# Patient Record
Sex: FEMALE | Race: BLACK OR AFRICAN AMERICAN | NOT HISPANIC OR LATINO | URBAN - METROPOLITAN AREA
[De-identification: names, ages, dates, MRNs, and addresses within clinical notes are randomized per-mention and may not be internally consistent; named-entity substitution may affect disease eponyms.]

---

## 2018-03-16 ENCOUNTER — TELEPHONE (OUTPATIENT)
Dept: TRANSPLANT | Facility: CLINIC | Age: 43
End: 2018-03-16

## 2018-03-16 NOTE — TELEPHONE ENCOUNTER
Jazmyn Siegel called and stated that she is interested in becoming a living donor for her mother, Wen Siegel.  Medical and social history obtained.  No contraindications noted.  All questions answered.  HLA kit requested.

## 2018-04-04 ENCOUNTER — TELEPHONE (OUTPATIENT)
Dept: TRANSPLANT | Facility: CLINIC | Age: 43
End: 2018-04-04

## 2018-04-04 DIAGNOSIS — Z00.5 TRANSPLANT DONOR EVALUATION: Primary | ICD-10-CM

## 2018-04-04 NOTE — TELEPHONE ENCOUNTER
Patient called stating that she has received the kit and will attempt to have blood drawn tomorrow, 4/5/18.  Instructions provided.  All questions answered and patient verbalized understanding.

## 2018-04-05 PROCEDURE — 81373 HLA I TYPING 1 LOCUS LR: CPT | Mod: PO

## 2018-04-05 PROCEDURE — 81373 HLA I TYPING 1 LOCUS LR: CPT | Mod: 91,PO

## 2018-04-05 PROCEDURE — 81376 HLA II TYPING 1 LOCUS LR: CPT | Mod: 91,PO

## 2018-04-05 PROCEDURE — 81376 HLA II TYPING 1 LOCUS LR: CPT | Mod: PO

## 2018-04-06 ENCOUNTER — TELEPHONE (OUTPATIENT)
Dept: TRANSPLANT | Facility: CLINIC | Age: 43
End: 2018-04-06

## 2018-04-06 ENCOUNTER — LAB VISIT (OUTPATIENT)
Dept: LAB | Facility: HOSPITAL | Age: 43
End: 2018-04-06
Payer: COMMERCIAL

## 2018-04-06 DIAGNOSIS — Z00.5 TRANSPLANT DONOR EVALUATION: ICD-10-CM

## 2018-04-06 NOTE — TELEPHONE ENCOUNTER
HLA called stating blood sample was received today for patient but red tube was not labeled.  Left voicemail for patient that another kit will be requested with only the red tube included to have redrawn.

## 2018-04-09 ENCOUNTER — LAB VISIT (OUTPATIENT)
Dept: LAB | Facility: HOSPITAL | Age: 43
End: 2018-04-09
Payer: COMMERCIAL

## 2018-04-09 ENCOUNTER — TELEPHONE (OUTPATIENT)
Dept: TRANSPLANT | Facility: CLINIC | Age: 43
End: 2018-04-09

## 2018-04-09 DIAGNOSIS — Z00.5 TRANSPLANT DONOR EVALUATION: ICD-10-CM

## 2018-04-09 LAB
ABO GROUP BLD: NORMAL
BLD GP AB SCN CELLS X3 SERPL QL: NORMAL
RH BLD: NORMAL

## 2018-04-09 PROCEDURE — 86900 BLOOD TYPING SEROLOGIC ABO: CPT | Mod: TXP

## 2018-04-09 PROCEDURE — 36415 COLL VENOUS BLD VENIPUNCTURE: CPT | Mod: TXP

## 2018-04-09 PROCEDURE — 86850 RBC ANTIBODY SCREEN: CPT | Mod: TXP

## 2018-04-09 PROCEDURE — 86905 BLOOD TYPING RBC ANTIGENS: CPT | Mod: TXP

## 2018-04-09 PROCEDURE — 86901 BLOOD TYPING SEROLOGIC RH(D): CPT | Mod: TXP

## 2018-04-09 NOTE — TELEPHONE ENCOUNTER
Left voicemail for patient to inform her that after speaking with HLA lab that her current sample can be used.  Another sample is not necessary.

## 2018-04-18 LAB — HLATY INTERPRETATION: NORMAL

## 2018-04-19 LAB — A1 AG RBC QL: NORMAL

## 2018-04-23 ENCOUNTER — TELEPHONE (OUTPATIENT)
Dept: TRANSPLANT | Facility: CLINIC | Age: 43
End: 2018-04-23

## 2018-04-23 NOTE — TELEPHONE ENCOUNTER
Patient notified that she is blood type A+, which is not compatible with her mother (O). Discussed the option of paired donation. Instructed to contact me once the recipient is on the waiting list if she would like to proceed. All questions were answered and patient verbalized understanding.

## 2018-05-08 LAB
HLA DRB4 1: NORMAL
HLA SSO DNA TYPING CLASS I & II INTERPRETATION: NORMAL
HLA-A 1 SERO. EQUIV: 2
HLA-A 1: NORMAL
HLA-A 2 SERO. EQUIV: 30
HLA-A 2: NORMAL
HLA-B 1 SERO. EQUIV: 18
HLA-B 1: NORMAL
HLA-B 2 SERO. EQUIV: 51
HLA-B 2: NORMAL
HLA-BW 1 SERO. EQUIV: 6
HLA-BW 2 SERO. EQUIV: 4
HLA-C 1: NORMAL
HLA-C 2: NORMAL
HLA-CW 1 SERO. EQUIV: 5
HLA-CW 2 SERO. EQUIV: 15
HLA-DQ 1 SERO. EQUIV: 2
HLA-DQ 2 SERO. EQUIV: 6
HLA-DQB1 1: NORMAL
HLA-DQB1 2: NORMAL
HLA-DRB1 1 SERO. EQUIV: 17
HLA-DRB1 1: NORMAL
HLA-DRB1 2 SERO. EQUIV: 15
HLA-DRB1 2: NORMAL
HLA-DRB3 1: NORMAL
HLA-DRB3 2: NORMAL
HLA-DRB345 1 SERO. EQUIV: 52
HLA-DRB345 2 SERO. EQUIV: 51
HLA-DRB4 2: NORMAL
HLA-DRB5 1: NORMAL
HLA-DRB5 2: NORMAL
SSDQB TESTING DATE: NORMAL
SSOA TESTING DATE: NORMAL
SSOB TESTING DATE: NORMAL
SSOC TESTING DATE: NORMAL
SSODR TESTING DATE: NORMAL
TYSSO TESTING DATE: NORMAL

## 2018-12-13 ENCOUNTER — TELEPHONE (OUTPATIENT)
Dept: TRANSPLANT | Facility: CLINIC | Age: 43
End: 2018-12-13

## 2018-12-13 DIAGNOSIS — Z00.5 TRANSPLANT DONOR EVALUATION: Primary | ICD-10-CM

## 2018-12-13 NOTE — TELEPHONE ENCOUNTER
Patient contacted me, would like to proceed with testing for paired donation. Required testing discussed and information provided to schedule appointments. All questions were answered.

## 2018-12-17 DIAGNOSIS — Z00.5 TRANSPLANT DONOR EVALUATION: Primary | ICD-10-CM

## 2019-01-29 ENCOUNTER — HOSPITAL ENCOUNTER (OUTPATIENT)
Dept: CARDIOLOGY | Facility: CLINIC | Age: 44
Discharge: HOME | End: 2019-01-29
Attending: NURSE PRACTITIONER
Payer: COMMERCIAL

## 2019-01-29 ENCOUNTER — HOSPITAL ENCOUNTER (OUTPATIENT)
Dept: RADIOLOGY | Facility: HOSPITAL | Age: 44
Discharge: HOME | End: 2019-01-29
Attending: NURSE PRACTITIONER
Payer: COMMERCIAL

## 2019-01-29 VITALS — HEIGHT: 66 IN | BODY MASS INDEX: 26.68 KG/M2 | WEIGHT: 166 LBS

## 2019-01-29 DIAGNOSIS — Z00.5 TRANSPLANT DONOR EVALUATION: ICD-10-CM

## 2019-01-29 LAB
ASCENDING AORTA: 3.14 CM
BSA FOR ECHO PROCEDURE: 1.87 M2
CV ECHO LV RWT: 0.36 CM
CV STRESS BASE HR: 70
DIASTOLIC BLOOD PRESSURE: 58
DOP CALC LVOT AREA: 3.46 CM2
DOP CALC LVOT DIAMETER: 2.1 CM
DOP CALC LVOT PEAK VEL: 0.94 M/S
DOP CALC LVOT STROKE VOLUME: 60.44 CM3
DOP CALCLVOT PEAK VEL VTI: 17.46 CM
E WAVE DECELERATION TIME: 174.32 MSEC
E/A RATIO: 1.2
E/E' RATIO: 5.45
ECHO LV POSTERIOR WALL: 0.76 CM (ref 0.6–1.1)
FRACTIONAL SHORTENING: 33 % (ref 28–44)
INTERVENTRICULAR SEPTUM: 0.75 CM (ref 0.6–1.1)
IVRT: 0.09 MSEC
LA MAJOR: 4.67 CM
LA MINOR: 5.45 CM
LA WIDTH: 4.16 CM
LEFT ATRIUM SIZE: 3.7 CM
LEFT ATRIUM VOLUME INDEX: 35.6 ML/M2
LEFT ATRIUM VOLUME: 65.81 CM3
LEFT INTERNAL DIMENSION IN SYSTOLE: 2.86 CM (ref 2.1–4)
LEFT VENTRICLE DIASTOLIC VOLUME INDEX: 43.68 ML/M2
LEFT VENTRICLE DIASTOLIC VOLUME: 80.72 ML
LEFT VENTRICLE MASS INDEX: 51.8 G/M2
LEFT VENTRICLE SYSTOLIC VOLUME INDEX: 16.8 ML/M2
LEFT VENTRICLE SYSTOLIC VOLUME: 31.02 ML
LEFT VENTRICULAR INTERNAL DIMENSION IN DIASTOLE: 4.25 CM (ref 3.5–6)
LEFT VENTRICULAR MASS: 95.73 G
LV LATERAL E/E' RATIO: 5
LV SEPTAL E/E' RATIO: 6
MV PEAK A VEL: 0.5 M/S
MV PEAK E VEL: 0.6 M/S
OHS CV CPX 1 MINUTE RECOVERY HEART RATE: 126 BPM
OHS CV CPX 85 PERCENT MAX PREDICTED HEART RATE MALE: 143
OHS CV CPX ESTIMATED METS: 13
OHS CV CPX MAX PREDICTED HEART RATE: 168
OHS CV CPX PATIENT IS FEMALE: 1
OHS CV CPX PATIENT IS MALE: 0
OHS CV CPX PEAK DIASTOLIC BLOOD PRESSURE: 48 MMHG
OHS CV CPX PEAK HEAR RATE: 164
OHS CV CPX PEAK RATE PRESSURE PRODUCT: NORMAL
OHS CV CPX PEAK SYSTOLIC BLOOD PRESSURE: 141
OHS CV CPX PERCENT MAX PREDICTED HEART RATE ACHIEVED: 98
OHS CV CPX PERCENT TARGET HEART RATE ACHIEVED: 114.69
OHS CV CPX RATE PRESSURE PRODUCT PRESENTING: 7070
OHS CV CPX TARGET HEART RATE: 143
PULM VEIN S/D RATIO: 1.03
PV PEAK D VEL: 0.39 M/S
PV PEAK S VEL: 0.4 M/S
RA MAJOR: 4.48 CM
RA WIDTH: 3.41 CM
RIGHT VENTRICULAR END-DIASTOLIC DIMENSION: 2.57 CM
RV TISSUE DOPPLER FREE WALL SYSTOLIC VELOCITY 1 (APICAL 4 CHAMBER VIEW): 13.42 M/S
SINUS: 3.17 CM
STJ: 2.84 CM
STRESS ECHO POST EXERCISE DUR MIN: 7 MIN
STRESS ECHO POST EXERCISE DUR SEC: 42
SYSTOLIC BLOOD PRESSURE: 101
TDI LATERAL: 0.12
TDI SEPTAL: 0.1
TDI: 0.11
TRICUSPID ANNULAR PLANE SYSTOLIC EXCURSION: 2.24 CM

## 2019-01-29 PROCEDURE — 93351 ECHOCARDIOGRAM STRESS TEST (CUPID ONLY): ICD-10-PCS | Mod: S$GLB,TXP,, | Performed by: INTERNAL MEDICINE

## 2019-01-29 PROCEDURE — 71046 XR CHEST PA AND LATERAL: ICD-10-PCS | Mod: 26,TXP,, | Performed by: RADIOLOGY

## 2019-01-29 PROCEDURE — 93351 STRESS TTE COMPLETE: CPT | Mod: S$GLB,TXP,, | Performed by: INTERNAL MEDICINE

## 2019-01-29 PROCEDURE — 71046 X-RAY EXAM CHEST 2 VIEWS: CPT | Mod: 26,TXP,, | Performed by: RADIOLOGY

## 2019-01-29 PROCEDURE — 71046 X-RAY EXAM CHEST 2 VIEWS: CPT | Mod: TC,FY,TXP

## 2019-01-30 ENCOUNTER — OFFICE VISIT (OUTPATIENT)
Dept: TRANSPLANT | Facility: CLINIC | Age: 44
End: 2019-01-30
Payer: COMMERCIAL

## 2019-01-30 ENCOUNTER — HOSPITAL ENCOUNTER (OUTPATIENT)
Dept: RADIOLOGY | Facility: HOSPITAL | Age: 44
Discharge: HOME | End: 2019-01-30
Attending: TRANSPLANT SURGERY
Payer: COMMERCIAL

## 2019-01-30 ENCOUNTER — HOSPITAL ENCOUNTER (OUTPATIENT)
Dept: RADIOLOGY | Facility: HOSPITAL | Age: 44
Discharge: HOME | End: 2019-01-30
Attending: INTERNAL MEDICINE
Payer: COMMERCIAL

## 2019-01-30 VITALS
RESPIRATION RATE: 17 BRPM | SYSTOLIC BLOOD PRESSURE: 108 MMHG | HEART RATE: 66 BPM | DIASTOLIC BLOOD PRESSURE: 62 MMHG | BODY MASS INDEX: 28.91 KG/M2 | HEIGHT: 64 IN | WEIGHT: 169.31 LBS | TEMPERATURE: 98 F | OXYGEN SATURATION: 98 %

## 2019-01-30 DIAGNOSIS — Z00.5 TRANSPLANT DONOR EVALUATION: ICD-10-CM

## 2019-01-30 DIAGNOSIS — Z00.5 TRANSPLANT DONOR EVALUATION: Primary | ICD-10-CM

## 2019-01-30 DIAGNOSIS — I10 HYPERTENSION, UNSPECIFIED TYPE: ICD-10-CM

## 2019-01-30 PROCEDURE — 3074F PR MOST RECENT SYSTOLIC BLOOD PRESSURE < 130 MM HG: ICD-10-PCS | Mod: CPTII,S$GLB,TXP, | Performed by: INTERNAL MEDICINE

## 2019-01-30 PROCEDURE — 99203 PR OFFICE/OUTPT VISIT, NEW, LEVL III, 30-44 MIN: ICD-10-PCS | Mod: S$GLB,TXP,, | Performed by: TRANSPLANT SURGERY

## 2019-01-30 PROCEDURE — 74177 CT ABDOMEN PELVIS WITH CONTRAST: ICD-10-PCS | Mod: 26,TXP,, | Performed by: RADIOLOGY

## 2019-01-30 PROCEDURE — A9539 TC99M PENTETATE: HCPCS | Mod: TXP

## 2019-01-30 PROCEDURE — 99999 PR PBB SHADOW E&M-EST. PATIENT-LVL IV: CPT | Mod: PBBFAC,TXP,, | Performed by: INTERNAL MEDICINE

## 2019-01-30 PROCEDURE — 74177 CT ABD & PELVIS W/CONTRAST: CPT | Mod: 26,TXP,, | Performed by: RADIOLOGY

## 2019-01-30 PROCEDURE — 99205 PR OFFICE/OUTPT VISIT, NEW, LEVL V, 60-74 MIN: ICD-10-PCS | Mod: S$GLB,TXP,, | Performed by: INTERNAL MEDICINE

## 2019-01-30 PROCEDURE — 99999 PR PBB SHADOW E&M-EST. PATIENT-LVL IV: ICD-10-PCS | Mod: PBBFAC,TXP,, | Performed by: INTERNAL MEDICINE

## 2019-01-30 PROCEDURE — 74177 CT ABD & PELVIS W/CONTRAST: CPT | Mod: TC,TXP

## 2019-01-30 PROCEDURE — 25500020 PHARM REV CODE 255: Mod: TXP | Performed by: TRANSPLANT SURGERY

## 2019-01-30 PROCEDURE — 78701 KIDNEY IMAGING WITH FLOW: CPT | Mod: 26,TXP,, | Performed by: RADIOLOGY

## 2019-01-30 PROCEDURE — 3078F PR MOST RECENT DIASTOLIC BLOOD PRESSURE < 80 MM HG: ICD-10-PCS | Mod: CPTII,S$GLB,TXP, | Performed by: INTERNAL MEDICINE

## 2019-01-30 PROCEDURE — 3008F BODY MASS INDEX DOCD: CPT | Mod: CPTII,S$GLB,TXP, | Performed by: INTERNAL MEDICINE

## 2019-01-30 PROCEDURE — 99203 OFFICE O/P NEW LOW 30 MIN: CPT | Mod: S$GLB,TXP,, | Performed by: TRANSPLANT SURGERY

## 2019-01-30 PROCEDURE — 3078F DIAST BP <80 MM HG: CPT | Mod: CPTII,S$GLB,TXP, | Performed by: INTERNAL MEDICINE

## 2019-01-30 PROCEDURE — 3074F SYST BP LT 130 MM HG: CPT | Mod: CPTII,S$GLB,TXP, | Performed by: INTERNAL MEDICINE

## 2019-01-30 PROCEDURE — 3008F PR BODY MASS INDEX (BMI) DOCUMENTED: ICD-10-PCS | Mod: CPTII,S$GLB,TXP, | Performed by: INTERNAL MEDICINE

## 2019-01-30 PROCEDURE — 78701 NM KIDNEY IMAGING MORPH W VASCULAR: ICD-10-PCS | Mod: 26,TXP,, | Performed by: RADIOLOGY

## 2019-01-30 PROCEDURE — 99205 OFFICE O/P NEW HI 60 MIN: CPT | Mod: S$GLB,TXP,, | Performed by: INTERNAL MEDICINE

## 2019-01-30 RX ORDER — POTASSIUM CHLORIDE 750 MG/1
10 TABLET, EXTENDED RELEASE ORAL DAILY
COMMUNITY

## 2019-01-30 RX ORDER — ATENOLOL AND CHLORTHALIDONE TABLET 50; 25 MG/1; MG/1
1 TABLET ORAL DAILY
COMMUNITY

## 2019-01-30 RX ADMIN — IOHEXOL 125 ML: 350 INJECTION, SOLUTION INTRAVENOUS at 04:01

## 2019-01-30 NOTE — LETTER
January 31, 2019                     [address]  [address]  [facility] [address]  Phone: [phone]   Patient: Jazmyn Siegel   MR Number: [id number]   YOB: 1975   Date of Visit: 1/30/2019       Dear      Thank you for referring Jazmyn Siegel to me for evaluation. Attached you will find relevant portions of my assessment and plan of care.    If you have questions, please do not hesitate to call me. I look forward to following Jazmyn Siegel along with you.    Sincerely,    Myrna Martinez MD    Enclosure    If you would like to receive this communication electronically, please contact [email or web address] or [phone] to request [employer] access.    [employer] is a tool which provides read-only access to select patient information with whom you have a relationship. Its easy to use and provides real time access to review your patients record including encounter summaries, notes, results, and demographic information.    If you feel you have received this communication in error or would no longer like to receive these types of communications, please e-mail [email or web address]

## 2019-01-30 NOTE — PROGRESS NOTES
"DONOR TEACHING NOTE    Met with Jazmyn Siegel today in clinic to review living donor education.        Topics covered included:  · Kidney donation is done voluntarily and of the donor's free will.  Process can stop at any time.   · Better success rates than cadaveric donation, shorter waiting time for the recipient: less than the 3-5 year wait on the list, more time to prepare: tests/surgery can be planned  · Laboratory studies of blood and urine.  Health exams: records of GYN/pap/mammogram (females); evaluation by transplant team and a psychiatrist (if indicated); diagnostic Tests: CXR, EKG, TB skin test, 24-hour urine collection, renal scan, CT of abdomen to assess kidney anatomy, and stress test (if indicated)  · Team will review workup for approval.  Copy of the approved criteria for donation given to patient.  Surgeon will decide which kidney will be donated.   · Benefits of laparoscopic nephrectomy: less pain, shorter hospital stay, a shorter recovery period.  Risks discussed: bleeding, deep vein thrombosis, pulmonary embolus, the need for re-operation.  Your operation may be converted to an "open" procedure if the surgeon feels it is medically necessary.  · To recovery room, transfer to TSU, if space allows or semi-private room.  Girard catheter/IV, average hospital stay is 1 day.  At discharge the cost of any prescriptions is the donor's responsibility.  · Post-operative visit 4 weeks after surgery or prior to returning to work, unless a complication arises and you need to be seen sooner.  Off of work for about 3 to 6 weeks, no driving for at least the first 3 weeks.  General surgical complications: infection, allergic reaction, and anesthesia.   · Long life considerations of living with one kidney: risk of HTN and kidney failure   · Following up with PCP 6 months after donation then yearly thereafter to monitor kidney function.  This should include weight, labs, urinalysis, and a blood pressure check.  We are " required to report your progress to [facility] at 6 months, 1 year, and 2 years post-donation.     Written educational material provided. Informed consent, including kidney paired donation, reviewed and signed. All questions were answered and patient verbalized understanding.     Patient is a suitable candidate for living kidney donation.

## 2019-01-30 NOTE — PROGRESS NOTES
"TRANSPLANT DONOR PSYCHOSOCIAL ASSESSMENT    Jazmyn Siegel  [address]  [place] [address]  Telephone Information:   Mobile [phone]     Home [phone] (home)  Work  There is no work phone number on file.  E-mail  [email or web address]    PHS Increased Risk Behavioral Questionnaire reviewed by : Yes   addressed any PHS increased risk behaviors or concerns. Resources and education provided as appropriate.    Sex: female  YOB: 1975  Age: 43 y.o.    Encounter Date: 1/30/2019  [place] Citizen: yes  Primary Language: English   Needed: no    Potential Recipient: Wen Siegel   Clinic Number: [id number]  Donor's Relationship to Patient: mother    Emergency Contact:  Name: Enid Siegel  Relationship: sister  Address: n/a   Phone Numbers:   [phone] (mobile)    Family/Social Support:   Number of dependents/: pt reports no dependents   Marital history: pt reports no marital history  Other family dynamics: pt presents with mother (potential recipient). Pt's mother exited assessment at this time. Pt resides alone. Pt reports supportive parents, sister and "friend".     Household Composition:   Pt resides alone     Do you and your caregivers have access to reliable transportation? yes  PRIMARY CAREGIVER: Enid Siegel, pt's sister, will be primary caregiver, phone number [phone]. Pt provided permission to SW to confirm caregiver plan.      provided in-depth information to patient and caregiver regarding pre- and post-transplant caregiver role.   strongly encourages patient and caregiver to have concrete plan regarding post-transplant care giving, including back-up caregiver(s) to ensure care giving needs are met as needed.    Patient and Caregiver states understanding all aspects of caregiver role/commitment and is able/willing/committed to being caregiver to the fullest extent necessary.    Patient and Caregiver verbalizes understanding " "of the education provided today and caregiver responsibilities.         remains available. Patient and Caregiver agree to contact  in a timely manner if concerns arise.      Able to take time off work without financial concerns: yes.     Additional Significant Others who will Assist with Transplant:  Name: Ramses Meneses  Age: 43  City: [place] State: [place]  Relationship: "friend"  Does person drive? yes    Living Will: no  Healthcare Power of : no Pt reports trusting parents with medical decisions   Advance Directives on file: <no information> per medical record.  Verbally reviewed LW/HCPA information.   provided patient with copy of LW/HCPA documents and provided education on completion of forms.    Education: college degree Doctorate -PharmD  Reading Ability: college  Reports difficulty with: N/A  Learns [employer]:  Hands-on and verbal instruction      Status: no  VA Benefits: no     Employment:  Pt is a Pharmacist   Fundraising and [employer] information provided to patient.  Patient verbalizes understanding.   remains available.    Spouse/Significant Other Employment: Pt did not report     Insurance: see potential recipient's insurance for donor coverage.    Does the donor have health insurance? Yes    Patient verbalizes clear understanding that patient may experience difficulty obtaining and/or be denied insurance coverages post-surgery.  This includes and is not limited to disability insurance, life insurance, health insurance, burial insurance, long term care insurance, and other insurances.  Patient also reports understanding that future health concerns related to or unrelated to transplantation may not be covered by patient's insurance.  Resources and information provided and reviewed.      Patient provides verbal permission to release any necessary information to outside resources for patient care and discharge planning.  Resources and information " provided and reviewed.      Infusion Service: patient utilizing? no  Home Health: patient utilizing? no  DME: no  ADLS:  Pt reports pt is independent with all ADLS including driving, bathing,walking,taking medications, cooking, housekeeping, eating, and shopping.     Adherence:   .   Adherence education and counseling provided    Per History Section:  Past Medical History:   Diagnosis Date    Hypertension 2016     she takes one combo BP med     Social History     Tobacco Use    Smoking status: Never Smoker    Smokeless tobacco: Never Used   Substance Use Topics    Alcohol use: Yes     Frequency: Monthly or less     Drinks per session: 1 or 2     Comment: occaionally     Social History     Substance and Sexual Activity   Drug Use No     Social History     Substance and Sexual Activity   Sexual Activity Yes       Patient states clear understanding of the potential impact of substance use as it relates to donor candidacy and is agreeable to random substance screening.  Substance abstinence/cessation counseling, education and resources provided and reviewed.     Arrests/DWI/Treatment/Rehab: patient denies    Psychiatric History:    Mental Health: Pt denies history of anxiety, depression and or overwhelming feelings of sadness at this time or in the past.   Psychiatrist/Counselor: Pt denies currently or in the past and reports willingness to meet with psychiatry if required by transplant.   Medications:  Pt denies utilizing mental health medications currently or in the past  Suicide/Homicide Issues: Pt denies feelings of wanting to harm self or other currently or in the past  Safety at home: Pt reports feeling safe at home.     Donation Knowledge/Expectations: Patient states having clear understanding and realistic expectations regarding the potential risks and potential benefits of organ transplantation and organ donation and agrees to discuss with health care team members and support system members, as well as to  "utilize available resources and express questions and/or concerns in order to further facilitate the patients informed decision-making.  Resources and information provided and reviewed.    Decision-making Process: Pt reports "this is my mom" as a reason for donation.  Patient states understanding that transplant and donation are not a guarantee that the donated organ will function.  Patient states understanding of kidney treatment options available for kidney patients, psychosocial aspects surrounding organ donation and transplantation as well as recovery.  Patient also states clear understanding that patient may choose to not donate at any time prior to surgery taking place, and that patient confidentiality is protected.  Patient reports expected compliance with health care regime and states understanding of importance of compliance.  Educational information provided.    Patient reports having a clear understanding  that risks and benefits may be involved with organ transplantation and with organ donation and  of the treatment options available to a potential transplant recipient. Patient reports clear understanding that psychosocial risk factors which may affect patient, including but not limited to feelings of depression, generalized anxiety, anxiety regarding dependence on others, post traumatic stress disorder, feelings of guilt and other emotional and/or mental concerns, and/or exacerbation of existing mental health concerns.     Detailed resources and education provided and discussed. Patient agrees to access appropriate resources in a timely manner as needed and to communicate concerns appropriately.      Feelings or Concerns: Pt reports no concerns at this time. Patient denies feelings of coercion, pressure or obligation to donate. Patient states that patient is not receiving any compensation for organ donation. Patient reports motivation to pursue organ donation at this time.     Patient reports having " clear and realistic expectations and understanding of the many psychosocial aspects involved with being a living organ donor, including but not limited to costs, compliance, medications, lab work, procedures, appointments, financial planning, preparedness, timely and appropriate communication of concerns, abstinence from non-prescribed drugs or substances, importance of adherence to and follow-through with all health care team recommendations, participation in health care and treatment planning, utilization of resources and follow-through, mental health counseling as needed and/or recommended, and the patient and caregiver responsibilities.  Patient states having a clear understanding of possible difficulty obtaining or possibility of being denied insurance coverage post-surgery.  This includes and is not limited to disability insurance, life insurance, health insurance, burial insurance, long-term care insurance and other insurances.  Educational and resource information provided and reviewed.  Patient also reports understanding that future health concerns related to or unrelated to organ donation may not be covered by patients insurance.    Coping:  Pt reports coping adequately with donation work up.    Interview Behavior: Jazmyn presents as alert and oriented x 4, pleasant, good eye contact, well groomed, recall good, concentration/judgement good, average intelligence, calm, communicative, cooperative and asking and answering questions appropriately.      Suitability for Donation: Jazmyn Siegel presents as an unacceptable candidate for donation at this time. SW needs to confirm caregiver plan before being suitable.     Recommendations/Additional Comments: SW NEEDS TO CONFIRM CAREGIVER PLAN.

## 2019-01-30 NOTE — PROGRESS NOTES
Pt is here today for kidney donor evaluation.  Labs and hx reviewed.  No nutrition intervention required at this time.

## 2019-01-30 NOTE — PROGRESS NOTES
Kidney Transplant Donor Evaluation    Subjective:       CC:  Initial evaluation of kidney donor candidacy.    HPI:  Ms. Sieegl is a 43 y.o. year old Black or  female who has presented to be evaluated as a potential living related donor for her mother who has advanced kidney disease with unknown etiology (per patient, mother had kidney disease at age of 44, and had ureter stent years back and ). Her Mat uncle had kidney transplant at age of 45 with unknown etiology.   Ms. Siegel reports being here without coercion, payment, guilt or other alternative motives other than wanting to help someone with kidney disease. Has history of HTN for last three years. On BP med. She has well-controlled BP with Tenoretic. No history of pregnancy, kidney stone, UTI, pyelonephritis, kidney mass or hematuria. Denies using NSAID, or herbs.  Patient denies any history of coronary artery disease, stroke, seizure disorder, chronic obstructive pulmonary disease, liver disease, kidney stones, gallstones, deep venous thrombosis, pulmonary embolism, recurrent urinary tract infections or malignancies.     Contraception method: intravaginal ring      Functional History  Jazmyn Siegel is able to climb a flight of stairs, walk a mile, jog, and play recreational sports without any limitation or difficulty.    Past Medical History:   Past Medical History:   Diagnosis Date    Hypertension 2016     she takes one combo BP med       Past Surgical History:   Past Surgical History:   Procedure Laterality Date    TONSILLECTOMY  1982       Medications:   Current Outpatient Medications   Medication Sig Dispense Refill    atenolol-chlorthalidone (TENORETIC) 50-25 mg Tab Take 1 tablet by mouth once daily.      potassium chloride SA (K-DUR,KLOR-CON) 10 MEQ tablet Take 10 mEq by mouth once daily.       No current facility-administered medications for this visit.        Allergies:   Review of patient's allergies indicates:  No Known  Allergies    Social History:  Social History     Socioeconomic History    Marital status: Single     Spouse name: Not on file    Number of children: 0    Years of education: Not on file    Highest education level: Not on file   Social Needs    Financial resource strain: Not on file    Food insecurity - worry: Not on file    Food insecurity - inability: Not on file    Transportation needs - medical: Not on file    Transportation needs - non-medical: Not on file   Occupational History    Occupation: Pharmacist   Tobacco Use    Smoking status: Never Smoker    Smokeless tobacco: Never Used   Substance and Sexual Activity    Alcohol use: Yes     Frequency: Monthly or less     Drinks per session: 1 or 2     Comment: occaionally    Drug use: No    Sexual activity: Yes   Other Topics Concern    Not on file   Social History Narrative    Not on file       Family History:   Family History   Problem Relation Age of Onset    Kidney disease Mother     Kidney disease Maternal Uncle         has kidney transplant 12 years back       Review of Systems  Constitutional: no fevers, chills, fatigue, loss of appetite, weight gain or loss, night sweats  Eyes: No dryness, redness, blurry vision, loss of vision  Ear, Nose, Throat: No hearing problems, runny nose, mouth dryness, problems swallowing, dental problems  Respiratory: No cough, shortness of breath, sputum, bloody sputum  Cardiovascular: No chest pain or palpitations  Gastrointestinal: no nausea, vomiting, diarrhea, constipation, abdominal pain, blood in stool  Genitourinary: No urinary frequency, urgency, incontinence, burning, pain, or bleeding; no flank pain  Skin: no rash or itching  Musculoskeletal: no arthritis or muscle pain  Neurologic: no motor weakness, numbness, tingling, or seizures  Psychiatric: no depression, mood swings, sabi, or anxiety  Hematologic: no easy bleeding or bruising, anemia, or blood clots  Endocrine: no thyroid problems, hot or cold  intolerance, or diabetes  Immunologic: no chronic infection, hay fever, eczema        Objective:   Physical Exam   Body mass index is 29.06 kg/m².   Vitals:    01/30/19 0736   BP: 117/72   Pulse: 77   Resp: 17   Temp: 98.4 °F (36.9 °C)         General: No acute distress, well groomed, alert and oriented x 3  HEENT: Normocephalic,  external inspection of ears and nose normal, moist mucous membranes, no oral ulcerations/lesions   Neck: Supple, symmetrical, trachea midline, no masses, no carotid bruits, no JVD, thyroid is normal without nodules or enlargement   Respiratory: Clear to auscultation bilaterally, respirations unlabored, no rales/rhonchi/wheezing   Cardiovacular: Regular rate and rhythm, S1, S2 normal, no murmurs, no rubs or gallops   Gastrointestinal: Soft, non-tender, bowel sounds normal, no masses, no palpable organomegaly  Extremities: No clubbing or cyanosis of upper extremities bilaterally, no pedal edema bilaterally; +2 bilateral femoral, posterior tibial, and dorsalis pedis pulses  Skin: warm and dry; no rash on exposed skin  Neurologic: No focal neurologic deficits.   Musculoskeletal: moves all extremities without difficulty, FROM, 5/5 strength, ambulates without an assistive device  Psychiatric: Normal mood and affect. Responds appropriately to questions.      Labs:  1/29/2019: Creatinine 0.8 mg/dL (Ref range: 0.5 - 1.4 mg/dL); Creatinine, Random Ur 63.0 mg/dL (Ref range: 15.0 - 325.0 mg/dL); Urine, Creatinine 36.0 mg/dL (Ref range: 15.0 - 325.0 mg/dL); BUN, Bld 13 mg/dL (Ref range: 6 - 20 mg/dL)  1/29/2019: Nitrite, UA Negative (Ref range: Negative)  ABO type: A POS    Assessment:     1. Transplant donor evaluation    2. Hypertension, unspecified type        Plan:   Donor Candidacy:   Based on the given information, Ms. Siegel appears to be acceptable but a high-risk candidate for kidney donation due to HTN and significant family history of kidney disease (mother and uncle at 40s) with unknown  etiology.  A final recommendation will be made by the selection committee after reviewing her complete workup.    - NM GFR  - 24 hour ambulatory BP monitoring      Myrna Martinez MD

## 2019-01-30 NOTE — PROGRESS NOTES
Transplant Surgery Kidney Donor Evaluation     Referring Physician:     Subjective:     Chief Complaint: Jazmyn Siegel is a 43 y.o. year old female who presents today wishing to be evaluated as a potential living related donor for her mother.    History of Present Illness:  Jazmyn reports being here without coercion, payment, guilt, or other alternative motives other than wanting to help someone with kidney disease.    Review of Systems   Constitutional: Negative.    Respiratory: Negative.    Cardiovascular: Negative.    Gastrointestinal: Negative.    All other systems reviewed and are negative.      Objective:   Physical Exam   Constitutional: She appears well-developed and well-nourished.   Abdominal: Soft. She exhibits no distension and no mass.   Vitals reviewed.    ABO type: A POS    Diagnoses:  Transplant donor        Transplant Surgery - Candidacy   Assessment/Plan:     Donor Candidacy: Based on information available thus far, Jazmyn is a suitable candidate for living kidney donation from the surgical point of view. Marginal GFR, h/o of hypertension and strong family history of unknow renal disease are concerning and will be discussed in our committee.     Colten Marie MD       Education: I discussed with the patient the requirements for donation including the compatibility of blood and tissue typing, healthy by physical examination and workup, as well as the desire to donate.  We discussed the risks related to surgery including the risks related to anesthesia, bleeding, infection, inability for surgery to be performed laparoscopically, risks of reoperation as well as the risks of death.  We discussed the length of hospitalization, return to work times, as well as follow-up post-donation.    I discussed the possibility that living donor sometimes encounter problems obtaining health insurance, or could have higher premium despite ongoing efforts of transplant professionals to educate insurance companies  on this issue.    I discussed with Jazmyn that donation is a voluntary activity, and reiterated it should be done willingly and for altruistic reasons only.  I reviewed that no payment should be made for donating.  I also discussed that coersion, guilt, pressure, or feelings of obligation are not appropriate reasons to donate.  The option to withdraw at any time was emphasized.  Jazmyn was reminded that a medical opt out can be given to protect her confidentiality, and no member of the transplant team will discuss specifics of her health or medical/social history with anyone else without permission.  The need for lifelong routine medical follow-up for optimal health, including routine health maintenance was reviewed.    Additionally, I discussed the need for our program to be able to contact living donors for UNOS reporting purposes for a minimum of 2 years.  Failure of our center to be able to provide such information could jeopardize our ability to continue to offer living donor transplants.  Jazmyn voices understanding and agrees to this follow-up.    I discussed the UNOS requirement for centers to report donor status for a minimum of two years. Jazmyn understands that failure to comply with requirement could have adverse consequences for our transplant program, and agrees to cooperate with all our required follow-up.    I reviewed with Jazmyn available lab results and other diagnostics from the evaluation process.

## 2019-01-30 NOTE — NURSING
PHARM.D. PRE-TRANSPLANT DONOR NOTE:    This patient's medication therapy was evaluated as part of her pre-transplant donor evaluation.    The following pharmacologic concerns were noted: none    T    Current Outpatient Medications   Medication Sig Dispense Refill    atenolol-chlorthalidone (TENORETIC) 50-25 mg Tab Take 1 tablet by mouth once daily.      potassium chloride SA (K-DUR,KLOR-CON) 10 MEQ tablet Take 10 mEq by mouth once daily.       No current facility-administered medications for this visit.      I am available for consultation and can be contacted, as needed by the other members of the Kidney Transplant team.

## 2019-02-12 ENCOUNTER — TELEPHONE (OUTPATIENT)
Dept: TRANSPLANT | Facility: HOSPITAL | Age: 44
End: 2019-02-12

## 2019-02-14 ENCOUNTER — TELEPHONE (OUTPATIENT)
Dept: TRANSPLANT | Facility: HOSPITAL | Age: 44
End: 2019-02-14

## 2019-02-14 ENCOUNTER — TELEPHONE (OUTPATIENT)
Dept: TRANSPLANT | Facility: CLINIC | Age: 44
End: 2019-02-14

## 2019-02-14 NOTE — TELEPHONE ENCOUNTER
OSAMN attempted to contact pt's reported primary caregiver, with no success. OSMAN later contacted pt to follow up on caregiver plan but was not able to speak with pt. OSMAN left a detailed message and remains available.

## 2019-02-14 NOTE — TELEPHONE ENCOUNTER
Pt's sister reports she will act as primary caregiver. SW provided [place] with caregiver education. Caregiver verbalized understanding.      Caregiver Information:   Name: Enid Siegel  Age: 41  Number: [phone]  Availability: flexible   Transportation: drives own vehicle       Pt presents as a suitable candidate for transplant at this time due to confirmed caregiver plan.

## 2019-02-15 ENCOUNTER — COMMITTEE REVIEW (OUTPATIENT)
Dept: TRANSPLANT | Facility: CLINIC | Age: 44
End: 2019-02-15

## 2019-02-15 NOTE — COMMITTEE REVIEW
Jazmyn Siegel was presented at selection committee on 2/15/19. Patient did meet selection criteria for living kidney donation. No absolute contraindications noted. No additional testing indicated.     CT scan reviewed, will have a left nephrectomy.     Patient notified of committee decision. Explained that they will be entered into the paired exchange system within the next week and notified when matches have been identified. All questions were answered and patient verbalized understanding.     Note written by Korina Munroe RN    ===============================================================  I was present at the meeting and attest to the decision of the committee.

## 2019-02-27 ENCOUNTER — TELEPHONE (OUTPATIENT)
Dept: TRANSPLANT | Facility: CLINIC | Age: 44
End: 2019-02-27

## 2019-02-27 NOTE — TELEPHONE ENCOUNTER
----- Message from Refugio Kelley MD sent at 2/27/2019 10:06 AM CST -----  Regarding: RE: Donor CT  Left   ----- Message -----  From: Korina Munroe  Sent: 2/15/2019  12:03 PM  To: Refugio Kelley MD  Subject: Donor CT                                         Review donor CT

## 2020-09-02 ENCOUNTER — TELEPHONE (OUTPATIENT)
Dept: TRANSPLANT | Facility: CLINIC | Age: 45
End: 2020-09-02

## 2020-09-02 NOTE — TELEPHONE ENCOUNTER
Patient notified that there has been activity in the paired exchange program, but no confirmed matches. Patient states that she is healthy and has developed no new medical problems since her evaluation. Discussed updating testing. Informed that we will update tests when a match is confirmed. Patient states that gyn is up to date and she is scheduled for a mammogram. Patient notified that she now needs a colonoscopy since she is 45 and AA. States she will work on scheduling locally. All questions were answered and patient verbalized understanding.